# Patient Record
Sex: FEMALE | Race: BLACK OR AFRICAN AMERICAN
[De-identification: names, ages, dates, MRNs, and addresses within clinical notes are randomized per-mention and may not be internally consistent; named-entity substitution may affect disease eponyms.]

---

## 2019-10-30 ENCOUNTER — HOSPITAL ENCOUNTER (OUTPATIENT)
Dept: HOSPITAL 61 - KCIC | Age: 64
Discharge: HOME | End: 2019-10-30
Attending: NURSE PRACTITIONER
Payer: COMMERCIAL

## 2019-10-30 DIAGNOSIS — M47.816: ICD-10-CM

## 2019-10-30 DIAGNOSIS — M51.37: ICD-10-CM

## 2019-10-30 DIAGNOSIS — M43.16: Primary | ICD-10-CM

## 2019-10-30 PROCEDURE — 72100 X-RAY EXAM L-S SPINE 2/3 VWS: CPT

## 2019-10-30 PROCEDURE — 73501 X-RAY EXAM HIP UNI 1 VIEW: CPT

## 2019-10-30 NOTE — [facility]
HIP LEFT 1 VIEW WITH PELVIS, LUMBAR SPINE 2-3V

 

History: Left-sided back pain. Left sciatica. Left hip pain.

 

Technique: 3 views lumbar spine AP view the pelvis and additional view of 

the left hip.

 

Comparison: None.

 

Findings:

Lumbar spine: Grade 1 anterolisthesis L4 on L5. Otherwise, normal 

alignment. Normal vertebral body height. No fracture. Multilevel 

degenerative disc disease most prominent L2-L3 and L5-S1. Multilevel facet

arthropathy most prominent L4-L5 and L5-S1.

 

Hips and pelvis: Normal alignment of the hips. No fracture.

 

Impression: 

1.  Multilevel lumbar spondylosis.

2.  Grade 1 anterolisthesis L4 on L5, likely due to facet arthropathy.

 

Electronically signed by: Pablo Chavez DO (10/30/2019 5:04 PM) [facility]

## 2019-10-30 NOTE — [facility]
HIP LEFT 1 VIEW WITH PELVIS, LUMBAR SPINE 2-3V

 

History: Left-sided back pain. Left sciatica. Left hip pain.

 

Technique: 3 views lumbar spine AP view the pelvis and additional view of 

the left hip.

 

Comparison: None.

 

Findings:

Lumbar spine: Grade 1 anterolisthesis L4 on L5. Otherwise, normal 

alignment. Normal vertebral body height. No fracture. Multilevel 

degenerative disc disease most prominent L2-L3 and L5-S1. Multilevel facet

arthropathy most prominent L4-L5 and L5-S1.

 

Hips and pelvis: Normal alignment of the hips. No fracture.

 

Impression: 

1.  Multilevel lumbar spondylosis.

2.  Grade 1 anterolisthesis L4 on L5, likely due to facet arthropathy.

 

Electronically signed by: Pablo Chavez DO (10/30/2019 5:04 PM) [facility]